# Patient Record
Sex: MALE | Race: WHITE | HISPANIC OR LATINO
[De-identification: names, ages, dates, MRNs, and addresses within clinical notes are randomized per-mention and may not be internally consistent; named-entity substitution may affect disease eponyms.]

---

## 2021-03-10 ENCOUNTER — NON-APPOINTMENT (OUTPATIENT)
Age: 41
End: 2021-03-10

## 2021-03-11 ENCOUNTER — APPOINTMENT (OUTPATIENT)
Dept: DERMATOLOGY | Facility: CLINIC | Age: 41
End: 2021-03-11
Payer: MEDICAID

## 2021-03-11 ENCOUNTER — NON-APPOINTMENT (OUTPATIENT)
Age: 41
End: 2021-03-11

## 2021-03-11 DIAGNOSIS — Z86.718 PERSONAL HISTORY OF OTHER VENOUS THROMBOSIS AND EMBOLISM: ICD-10-CM

## 2021-03-11 DIAGNOSIS — D48.5 NEOPLASM OF UNCERTAIN BEHAVIOR OF SKIN: ICD-10-CM

## 2021-03-11 DIAGNOSIS — Z78.9 OTHER SPECIFIED HEALTH STATUS: ICD-10-CM

## 2021-03-11 DIAGNOSIS — D22.5 MELANOCYTIC NEVI OF TRUNK: ICD-10-CM

## 2021-03-11 PROBLEM — Z00.00 ENCOUNTER FOR PREVENTIVE HEALTH EXAMINATION: Status: ACTIVE | Noted: 2021-03-11

## 2021-03-11 PROCEDURE — 99072 ADDL SUPL MATRL&STAF TM PHE: CPT

## 2021-03-11 PROCEDURE — 99203 OFFICE O/P NEW LOW 30 MIN: CPT

## 2021-03-11 RX ORDER — RIVAROXABAN 10 MG/1
10 TABLET, FILM COATED ORAL
Refills: 0 | Status: ACTIVE | COMMUNITY

## 2021-03-11 NOTE — PHYSICAL EXAM
[Alert] : alert [Oriented x 3] : ~L oriented x 3 [Well Nourished] : well nourished [FreeTextEntry3] : The following areas were examined and no significant abnormalities were seen except as noted below:\par \par Type II skin\par \par scalp, face, eyelids, nose, lips, ears, neck, chest, abdomen, back,groin, buttocks, right arm, left arm, right hand, left hand,\par right  leg, left leg, right foot, left foot\par \par Trunk: Mild small dark brown macules\par Left lower lateral calf: 15 x 14 mm black slightly verrucous plaque with a more purple border\par Left sole: 15 x 10 mm ill-defined calloused plaque with crusts\par \par Where the suspicious lesion seen

## 2021-03-11 NOTE — HISTORY OF PRESENT ILLNESS
[FreeTextEntry1] : Basal cell carcinoma on the left sole [de-identified] : First visit for 40-year-old Montenegrin-speaking male refeerred by Mayra Heck MD, with a nine-month history of a lesion on the left sole.  Originally treated by a podiatrist for her presumed wart with some type of topical patch.  Later treated with a "laser" without improvement.\par Biopsy done: Basal cell carcinoma nodular type.\par No prior history of skin cancer.\par \par Note: Patient has history of prior varicose vein surgery on the left leg\par

## 2021-03-11 NOTE — CONSULT LETTER
[Dear  ___] : Dear  [unfilled], [Consult Letter:] : I had the pleasure of evaluating your patient, [unfilled]. [Consult Closing:] : Thank you very much for allowing me to participate in the care of this patient.  If you have any questions, please do not hesitate to contact me. [Sincerely,] : Sincerely, [FreeTextEntry2] :  Mayra Heck MD [FreeTextEntry1] : He has a biopsy proven basal cell carcinoma on the left sole. This needs to be treated via Mohs surgery, something I do not do.\par \par I have referred him to our Mohs surgeon to have this done.\par \par Please see attached chart note for further details. [FreeTextEntry3] : Bartolome Ch MD\par [address], [address]\par [place] [address]\par Tel ([phone])\par Fax ([phone])\par Private line ([phone])\par

## 2021-03-11 NOTE — ASSESSMENT
[FreeTextEntry1] : Basal cell carcinoma on left sole\par ? Seborrheic keratosis,? Type of nevus in the left calf

## 2021-03-17 ENCOUNTER — APPOINTMENT (OUTPATIENT)
Dept: DERMATOLOGY | Facility: CLINIC | Age: 41
End: 2021-03-17
Payer: MEDICAID

## 2021-03-17 PROCEDURE — 99215 OFFICE O/P EST HI 40 MIN: CPT

## 2021-03-17 PROCEDURE — 99072 ADDL SUPL MATRL&STAF TM PHE: CPT

## 2021-03-24 ENCOUNTER — TRANSCRIPTION ENCOUNTER (OUTPATIENT)
Age: 41
End: 2021-03-24

## 2021-03-29 ENCOUNTER — APPOINTMENT (OUTPATIENT)
Dept: DERMATOLOGY | Facility: CLINIC | Age: 41
End: 2021-03-29

## 2021-04-05 ENCOUNTER — APPOINTMENT (OUTPATIENT)
Dept: DERMATOLOGY | Facility: CLINIC | Age: 41
End: 2021-04-05
Payer: MEDICAID

## 2021-04-05 DIAGNOSIS — L84 CORNS AND CALLOSITIES: ICD-10-CM

## 2021-04-05 PROCEDURE — 99072 ADDL SUPL MATRL&STAF TM PHE: CPT

## 2021-04-05 PROCEDURE — 99213 OFFICE O/P EST LOW 20 MIN: CPT

## 2021-04-05 RX ORDER — GABAPENTIN 100 MG/1
100 CAPSULE ORAL
Qty: 30 | Refills: 0 | Status: DISCONTINUED | COMMUNITY
Start: 2021-01-11

## 2021-04-05 NOTE — ASSESSMENT
[FreeTextEntry1] : Probable seborrheic dermatitis on face\par ? Hypertrophic scar left sole - no evidence of active wart\par ? Incipient corn on right sole

## 2021-04-05 NOTE — HISTORY OF PRESENT ILLNESS
[FreeTextEntry1] : Plantar warts [de-identified] : Followup visit for 40-year-old Persian-speaking male first seen by me on March 11, 2021, with a 10 month history of a lesion on the left sole. Originally treated by a podiatrist for presumed wart with a topical patch and later with a laser without improvement.  Lesion was actually biopsied with a diagnosis of basal cell carcinoma.  A review of the biopsy from the lab revealed an error and it is actually a plantar wart.\par Patient complains of a new lesion on the right foot\par \par patient also complains of "irritation" in the inner cheeks and chin

## 2021-04-05 NOTE — PHYSICAL EXAM
[FreeTextEntry3] : Patient wearing a facemask\par \par Face: Inner cheek-within normal limits\par Chin: Mild ill-defined pinkness and scaling\par Left mid sole: 8 x 8 mm pink smooth firm plaque\par Left distal sole: 3 x 3 mm tan papule

## 2021-06-18 ENCOUNTER — NON-APPOINTMENT (OUTPATIENT)
Age: 41
End: 2021-06-18

## 2021-07-12 ENCOUNTER — APPOINTMENT (OUTPATIENT)
Dept: DERMATOLOGY | Facility: CLINIC | Age: 41
End: 2021-07-12
Payer: MEDICAID

## 2021-07-12 DIAGNOSIS — L91.0 HYPERTROPHIC SCAR: ICD-10-CM

## 2021-07-12 DIAGNOSIS — L21.8 OTHER SEBORRHEIC DERMATITIS: ICD-10-CM

## 2021-07-12 PROCEDURE — 99213 OFFICE O/P EST LOW 20 MIN: CPT

## 2021-07-12 RX ORDER — KETOCONAZOLE 20 MG/G
2 CREAM TOPICAL
Qty: 1 | Refills: 5 | Status: ACTIVE | COMMUNITY
Start: 2021-07-12 | End: 1900-01-01

## 2021-07-12 RX ORDER — HYDROCORTISONE 25 MG/G
2.5 OINTMENT TOPICAL
Qty: 1 | Refills: 5 | Status: ACTIVE | COMMUNITY
Start: 2021-04-05 | End: 1900-01-01

## 2021-07-12 RX ORDER — CLOBETASOL PROPIONATE 0.5 MG/G
0.05 OINTMENT TOPICAL
Qty: 1 | Refills: 1 | Status: ACTIVE | COMMUNITY
Start: 2021-04-05 | End: 1900-01-01

## 2021-07-12 NOTE — PHYSICAL EXAM
[Alert] : alert [Oriented x 3] : ~L oriented x 3 [Well Nourished] : well nourished [FreeTextEntry3] : Face: Practically clear\par Left mid sole: 12 x 12 mm pink smooth plaque\par Left distal sole: Lesion resolved with a 3 mm brown macule

## 2021-07-12 NOTE — ASSESSMENT
[FreeTextEntry1] : ? Seborrheic dermatitis on face: Currently doing well\par Hypertrophic scar on left sole\par Probable corn:  resolved

## 2021-07-12 NOTE — HISTORY OF PRESENT ILLNESS
[FreeTextEntry1] : Plantar warts [de-identified] : Followup visit for 40-year-old Korean-speaking male first seen by me on March 11, 2021, with a 10 month history of a lesion on the left sole. Originally treated by a podiatrist for presumed wart with a topical patch and later with a laser without improvement.  Lesion was actually biopsied with a diagnosis of basal cell carcinoma.  A review of the biopsy from the lab revealed an error and it is actually a plantar wart.\par \par At the last visit, patient is noted to have an 8 x 8 mm pink smooth firm plaque on the left mid sole.\par This was treated with clobetasol ointment 0.05% b.i.d.\par Patient also had a 3 x 3 mm tan papule on the left distal sole felt to be a possible corn.  This lesion was pared deeply with extrusion of a deep core. No obvious wart seen.\par Patient advised to consult a podiatrist for possible padding of both lesions.\par \par Patient also had a rash on the face diagnosed as probable seborrheic dermatitis. This was treated with 2-1/2% hydrocortisone ointment one to 2 times a day p.r.n.\par Patient is using this twice a day.  States the rash recurs if he goes off it.

## 2021-08-18 ENCOUNTER — LABORATORY RESULT (OUTPATIENT)
Age: 41
End: 2021-08-18

## 2021-08-18 ENCOUNTER — APPOINTMENT (OUTPATIENT)
Dept: RHEUMATOLOGY | Facility: CLINIC | Age: 41
End: 2021-08-18
Payer: MEDICAID

## 2021-08-18 ENCOUNTER — NON-APPOINTMENT (OUTPATIENT)
Age: 41
End: 2021-08-18

## 2021-08-18 VITALS
BODY MASS INDEX: 29.12 KG/M2 | HEIGHT: 71 IN | HEART RATE: 59 BPM | OXYGEN SATURATION: 99 % | SYSTOLIC BLOOD PRESSURE: 145 MMHG | DIASTOLIC BLOOD PRESSURE: 84 MMHG | WEIGHT: 208 LBS | TEMPERATURE: 97.5 F

## 2021-08-18 DIAGNOSIS — M25.50 PAIN IN UNSPECIFIED JOINT: ICD-10-CM

## 2021-08-18 DIAGNOSIS — Z86.718 PERSONAL HISTORY OF OTHER VENOUS THROMBOSIS AND EMBOLISM: ICD-10-CM

## 2021-08-18 DIAGNOSIS — R76.0 RAISED ANTIBODY TITER: ICD-10-CM

## 2021-08-18 PROCEDURE — 99204 OFFICE O/P NEW MOD 45 MIN: CPT | Mod: 25

## 2021-08-18 RX ORDER — CYCLOBENZAPRINE HYDROCHLORIDE 10 MG/1
10 TABLET, FILM COATED ORAL
Qty: 30 | Refills: 3 | Status: ACTIVE | COMMUNITY
Start: 2021-08-18 | End: 1900-01-01

## 2021-08-18 NOTE — DATA REVIEWED
[FreeTextEntry1] : homocytein normal\par antithrombin normal\par Protein s AG normal\par factor V leiden normal\par PTT 75 DRVVT>180\par ACL \par ACL iGM >150\par B2GP IgG 109\par B2GP igM 150\par

## 2021-08-18 NOTE — PHYSICAL EXAM
[General Appearance - Well Nourished] : well nourished [General Appearance - Well Developed] : well developed [Sclera] : the sclera and conjunctiva were normal [Nail Clubbing] : no clubbing  or cyanosis of the fingernails [Motor Tone] : muscle strength and tone were normal [Musculoskeletal - Swelling] : no joint swelling seen [] : no rash [Skin Lesions] : no skin lesions [Affect] : the affect was normal [Mood] : the mood was normal [FreeTextEntry1] : wearing compression stocking of the left leg with hyperpigmentation medial leg with mild skin tightness

## 2021-08-18 NOTE — HISTORY OF PRESENT ILLNESS
[FreeTextEntry1] : 39 yo man with history of basal cancer of the sole of the left foot, DVT on Xarelto, fatty liver.  patient presents for evaluation of joint pain.  he is sent by hematologist who has noted that he has positive DRVVT, high titer ACL and B2CP antibodies.  per patient report on xarelto he continues to have DVTs. \par \par patient states that he has joint pain for now about 1 year.  Patient joint pain is worse at rest.  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  however at the end of the day as he rest and drives home the pains become unbearable.  he can not make a fist in the morning and drops things as he can not hold things tight.\par \par patient states that he does notice swelling of the knees and can not use stairs\par \par On rare occasion does he take tylenol for pain.  states that tylenol makes him sleepy.  \par \par He basically bears the pain \par \par ROS: patient denies any alopecia, oral lesions, photosensitive, f/c/s, recurrent sinus infection, nose bleeding,  CP, cough, sob, serositis, low plts or anemia,  \par \par questionable eye inflammation 6 months ago ???\par occasional dry eye \par had leg surgery due to poor circulation \par develop HAs aftre surgery had normal MRI \par no back pain \par \par homocytein normal\par antithrombin normal\par Protein s AG normal\par factor V leiden normal\par PTT 75 DRVVT>180\par ACL \par ACL iGM >150\par B2GP IgG 109\par B2GP igM 150\par \par   \par    [Fever] : no fever [Chills] : no chills [Fatigue] : no fatigue [Oral Ulcers] : no oral ulcers [Cough] : no cough [Shortness of Breath] : no shortness of breath [Chest Pain] : no chest pain [Eye Pain] : no eye pain [Eye Redness] : no eye redness

## 2021-08-18 NOTE — ASSESSMENT
[FreeTextEntry1] : 39 yo man with multiple  DVT and high titer ACL, B2GP and DRVVT. and with joint pain. possible CTD \par \par --per patient while on xarelto continues to have more DVTs and they are considering switching to Coumadin \par --will check serologies and inflammatory markers .  repeat APLS labs \par --will check G6PD in case we need HCQ  \par --will need to obtain record from Dr Jack Franz ( [facility]) \par --GIVen AC , no NSAIDS.  will give flexeril for muscular/joint pain for now \par --patient to bring in eye clinic records

## 2021-09-15 ENCOUNTER — APPOINTMENT (OUTPATIENT)
Dept: RHEUMATOLOGY | Facility: CLINIC | Age: 41
End: 2021-09-15
Payer: MEDICAID

## 2021-09-15 VITALS
BODY MASS INDEX: 28.98 KG/M2 | SYSTOLIC BLOOD PRESSURE: 125 MMHG | WEIGHT: 207 LBS | HEART RATE: 60 BPM | TEMPERATURE: 97.2 F | HEIGHT: 71 IN | DIASTOLIC BLOOD PRESSURE: 76 MMHG | OXYGEN SATURATION: 98 %

## 2021-09-15 PROCEDURE — 99214 OFFICE O/P EST MOD 30 MIN: CPT

## 2021-09-15 RX ORDER — ERGOCALCIFEROL 1.25 MG/1
1.25 MG CAPSULE ORAL
Qty: 12 | Refills: 0 | Status: ACTIVE | COMMUNITY
Start: 2021-09-15 | End: 1900-01-01

## 2021-09-15 NOTE — HISTORY OF PRESENT ILLNESS
[FreeTextEntry1] : 41 yo man with history of basal cancer of the sole of the left foot, DVT on Xarelto, fatty liver.  patient presents for evaluation of joint pain.  he is sent by hematologist who has noted that he has positive DRVVT, high titer ACL and B2CP antibodies.  per patient report on xarelto he continues to have DVTs. \par \par patient states that he has joint pain for now about 1 year.  Patient joint pain is worse at rest.  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  however at the end of the day as he rest and drives home the pains become unbearable.  he can not make a fist in the morning and drops things as he can not hold things tight.\par \par patient states that he does notice swelling of the knees and can not use stairs\par \par On rare occasion does he take tylenol for pain.  states that tylenol makes him sleepy.  \par \par He basically bears the pain \par \par ROS: patient denies any alopecia, oral lesions, photosensitive, f/c/s, recurrent sinus infection, nose bleeding,  CP, cough, sob, serositis, low plts or anemia.  endorses occasional dry eye which he attributes to contact lens wear. no history of parotid gland pain or enlargement. nof/c/s, night sweat or lost of wt. \par \par questionable eye inflammation 6 months ago ???\par occasional dry eye \par had leg surgery due to poor circulation \par develop HAs after surgery had normal MRI \par no back pain \par \par labs: \par dsDNA 91\par c4 5 \par c3 80\par normal UA and PCR\par Ro>8\par deborah 1:320 speckled\par RNA polymerase 21( borderline) \par panca positive but negative MPO and PR3\par NEGATIVE: RF/CCP/HLA b27/sm/rnp/la/IVA/centromere\par normal G6PD\par normal TSH/CPK\par ++TG antibodies 138\par negative GC/LYme\par homocytein normal\par antithrombin normal\par Protein s AG normal\par factor V leiden normal\par PTT 75 DRVVT>180 ( on xarelto) \par ACL  ( >150) \par ACL iGM >150 ( >150) \par B2GP IgG 109 ( 104)\par B2GP igM 150 ( >150) \par vit d 21\par \par   \par

## 2021-09-15 NOTE — ASSESSMENT
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  \par \par --patient is to start plaquenil.  he will have eye exam once every year.  we have  discuss potential side effects\par --will closely monitor.  he is to repeat Urine studies and blood work in 6 weeks.   \par --vit d deficency start ergocal \par --would favor coumadin with ASA given his APLS and multiple DVTs.  he is to further discuss with hematology

## 2021-09-15 NOTE — PHYSICAL EXAM
[General Appearance - Well Nourished] : well nourished [General Appearance - Well Developed] : well developed [Sclera] : the sclera and conjunctiva were normal [Hearing Threshold Finger Rub Not [place]] : hearing was normal [Nail Clubbing] : no clubbing  or cyanosis of the fingernails [Musculoskeletal - Swelling] : no joint swelling seen [Motor Tone] : muscle strength and tone were normal [] : no rash [Skin Lesions] : no skin lesions [FreeTextEntry1] : leftLE wearing compression stocking and has hyperpigmentation over the medial malleolus [Affect] : the affect was normal [Mood] : the mood was normal

## 2021-10-04 LAB
25(OH)D3 SERPL-MCNC: 21.8 NG/ML
ALBUMIN SERPL ELPH-MCNC: 4.7 G/DL
ALP BLD-CCNC: 159 U/L
ALT SERPL-CCNC: 24 U/L
ANA PAT FLD IF-IMP: ABNORMAL
ANA SER IF-ACNC: ABNORMAL
ANION GAP SERPL CALC-SCNC: 11 MMOL/L
APPEARANCE: CLEAR
APTT 2H P 1:4 NP PPP: 63.4 SEC
APTT 2H P INC PPP: 66.6 SEC
APTT 50/50 MIX COMMENT: NORMAL
APTT IMM NP/PRE NP PPP: 59.9 SEC
APTT INV RATIO PPP: 77 SEC
AST SERPL-CCNC: 16 U/L
B BURGDOR AB SER-IMP: NEGATIVE
B BURGDOR IGM PATRN SER IB-IMP: NEGATIVE
B BURGDOR18KD IGG SER QL IB: NORMAL
B BURGDOR23KD IGG SER QL IB: NORMAL
B BURGDOR23KD IGM SER QL IB: NORMAL
B BURGDOR28KD IGG SER QL IB: NORMAL
B BURGDOR30KD IGG SER QL IB: NORMAL
B BURGDOR31KD IGG SER QL IB: NORMAL
B BURGDOR39KD IGG SER QL IB: NORMAL
B BURGDOR39KD IGM SER QL IB: NORMAL
B BURGDOR41KD IGG SER QL IB: NORMAL
B BURGDOR41KD IGM SER QL IB: NORMAL
B BURGDOR45KD IGG SER QL IB: NORMAL
B BURGDOR58KD IGG SER QL IB: NORMAL
B BURGDOR66KD IGG SER QL IB: NORMAL
B BURGDOR93KD IGG SER QL IB: NORMAL
B19V IGG SER QL IA: 8.08 INDEX
B19V IGG+IGM SER-IMP: NORMAL
B19V IGG+IGM SER-IMP: POSITIVE
B19V IGM FLD-ACNC: 0.3 INDEX
B19V IGM SER-ACNC: NEGATIVE
B2 GLYCOPROT1 IGA SERPL IA-ACNC: 9.1 SAU
B2 GLYCOPROT1 IGG SER-ACNC: 104.1 SGU
B2 GLYCOPROT1 IGM SER-ACNC: >150 SMU
BASOPHILS # BLD AUTO: 0.02 K/UL
BASOPHILS NFR BLD AUTO: 0.4 %
BILIRUB SERPL-MCNC: 0.5 MG/DL
BILIRUBIN URINE: NEGATIVE
BLOOD URINE: NEGATIVE
BUN SERPL-MCNC: 14 MG/DL
C TRACH RRNA SPEC QL NAA+PROBE: NOT DETECTED
C3 SERPL-MCNC: 80 MG/DL
C4 SERPL-MCNC: 5 MG/DL
CALCIUM SERPL-MCNC: 9.1 MG/DL
CARDIOLIPIN AB SER IA-ACNC: POSITIVE
CARDIOLIPIN IGM SER-MCNC: >150 GPL
CARDIOLIPIN IGM SER-MCNC: >150 MPL
CCP AB SER IA-ACNC: <8 UNITS
CENTROMERE IGG SER-ACNC: <0.2 CD:130001892
CHLORIDE SERPL-SCNC: 103 MMOL/L
CK SERPL-CCNC: 124 U/L
CO2 SERPL-SCNC: 25 MMOL/L
COLOR: NORMAL
CONFIRM: 35.8 SEC
CREAT SERPL-MCNC: 0.85 MG/DL
CREAT SPEC-SCNC: 87 MG/DL
CREAT/PROT UR: 0.1 RATIO
CRP SERPL-MCNC: 13 MG/L
DRVVT IMM 1:2 NP PPP: ABNORMAL
DRVVT SCREEN TO CONFIRM RATIO: 2.68 RATIO
DSDNA AB SER-ACNC: 91 IU/ML
ENA JO1 AB SER IA-ACNC: <0.2 AL
ENA RNP AB SER IA-ACNC: <0.2 AL
ENA SCL70 IGG SER IA-ACNC: <0.2 AL
ENA SM AB SER IA-ACNC: <0.2 AL
ENA SS-A AB SER IA-ACNC: >8 AL
ENA SS-B AB SER IA-ACNC: <0.2 AL
EOSINOPHIL # BLD AUTO: 0.13 K/UL
EOSINOPHIL NFR BLD AUTO: 2.8 %
ERYTHROCYTE [SEDIMENTATION RATE] IN BLOOD BY WESTERGREN METHOD: 29 MM/HR
G6PD SER-CCNC: 16.6 U/G HGB
GLUCOSE QUALITATIVE U: NEGATIVE
GLUCOSE SERPL-MCNC: 92 MG/DL
HCT VFR BLD CALC: 46.3 %
HGB BLD-MCNC: 14.4 G/DL
HLA-B27 RELATED AG QL: NEGATIVE
IMM GRANULOCYTES NFR BLD AUTO: 0.2 %
KETONES URINE: NEGATIVE
LEUKOCYTE ESTERASE URINE: NEGATIVE
LYMPHOCYTES # BLD AUTO: 1.05 K/UL
LYMPHOCYTES NFR BLD AUTO: 22.9 %
MAN DIFF?: NORMAL
MCHC RBC-ENTMCNC: 25.8 PG
MCHC RBC-ENTMCNC: 31.1 GM/DL
MCV RBC AUTO: 83 FL
MONOCYTES # BLD AUTO: 0.47 K/UL
MONOCYTES NFR BLD AUTO: 10.2 %
MPO AB + PR3 PNL SER: NORMAL
N GONORRHOEA RRNA SPEC QL NAA+PROBE: NOT DETECTED
NEUTROPHILS # BLD AUTO: 2.91 K/UL
NEUTROPHILS NFR BLD AUTO: 63.5 %
NITRITE URINE: NEGATIVE
NPP NORMAL POOLED PLASMA: 32.7 SECS
PH URINE: 6
PLATELET # BLD AUTO: 153 K/UL
POTASSIUM SERPL-SCNC: 4.2 MMOL/L
PROT SERPL-MCNC: 7.7 G/DL
PROT UR-MCNC: 4 MG/DL
PROTEIN URINE: NEGATIVE
RBC # BLD: 5.58 M/UL
RBC # FLD: 14.1 %
RF+CCP IGG SER-IMP: NEGATIVE
RHEUMATOID FACT SER QL: <10 IU/ML
RNA POLYMERASE III IGG: 21 UNITS
SCREEN DRVVT: 129.3 SEC
SODIUM SERPL-SCNC: 139 MMOL/L
SOURCE AMPLIFICATION: NORMAL
SPECIFIC GRAVITY URINE: 1.01
T PALLIDUM AB SER QL IA: NEGATIVE
THYROGLOB AB SERPL-ACNC: 138 IU/ML
THYROPEROXIDASE AB SERPL IA-ACNC: 33.2 IU/ML
TSH SERPL-ACNC: 1.54 UIU/ML
UROBILINOGEN URINE: NORMAL
WBC # FLD AUTO: 4.59 K/UL

## 2021-10-12 ENCOUNTER — APPOINTMENT (OUTPATIENT)
Dept: DERMATOLOGY | Facility: CLINIC | Age: 41
End: 2021-10-12

## 2021-12-15 ENCOUNTER — APPOINTMENT (OUTPATIENT)
Dept: RHEUMATOLOGY | Facility: CLINIC | Age: 41
End: 2021-12-15
Payer: MEDICAID

## 2021-12-15 ENCOUNTER — NON-APPOINTMENT (OUTPATIENT)
Age: 41
End: 2021-12-15

## 2021-12-15 ENCOUNTER — APPOINTMENT (OUTPATIENT)
Dept: OPHTHALMOLOGY | Facility: CLINIC | Age: 41
End: 2021-12-15
Payer: MEDICAID

## 2021-12-15 VITALS
SYSTOLIC BLOOD PRESSURE: 133 MMHG | HEART RATE: 72 BPM | BODY MASS INDEX: 28.98 KG/M2 | DIASTOLIC BLOOD PRESSURE: 76 MMHG | OXYGEN SATURATION: 98 % | RESPIRATION RATE: 18 BRPM | WEIGHT: 207 LBS | HEIGHT: 71 IN

## 2021-12-15 PROCEDURE — 92134 CPTRZ OPH DX IMG PST SGM RTA: CPT

## 2021-12-15 PROCEDURE — 99214 OFFICE O/P EST MOD 30 MIN: CPT

## 2021-12-15 PROCEDURE — 92004 COMPRE OPH EXAM NEW PT 1/>: CPT

## 2021-12-15 NOTE — HISTORY OF PRESENT ILLNESS
[FreeTextEntry1] : 41 yo man with history of basal cancer of the sole of the left foot, fatty liver, APLS ( with triple positive and multiple DVTS) , SLE ( +DEBORAH/+dsDNA/low complements, arthritis and possible oral ulcers) , sjogrens ( dry eye/mouth and positive Ro) \par \par patient was started on plaquenil.  patient denies any more joint pain or swelling.  previously could not hold things tight due to have swelling and pain.  he was not able to use stairs due to knee pain.  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  rest worsen these joint pains.  patient denies nay new rashes, alopecia, oral lesions, sob/cough. will be see eye clinic today.  new labs show \par \par \par ROS: patient denies any alopecia, oral lesions, photosensitive, f/c/s, recurrent sinus infection, nose bleeding,  CP, cough, sob, serositis, low plts or anemia.  endorses occasional dry eye which he attributes to contact lens wear. no history of parotid gland pain or enlargement. nof/c/s, night sweat or lost of wt. \par \par questionable eye inflammation 6 months ago ???\par occasional dry eye \par had leg surgery due to poor circulation ( DVts) \par develop HAs after surgery had normal MRI \par no back pain \par \par labs: \par dsDNA 91\par c4 5 \par c3 80\par normal UA and PCR\par Ro>8\par deborah 1:320 speckled\par RNA polymerase 21( borderline) \par panca positive but negative MPO and PR3\par NEGATIVE: RF/CCP/HLA b27/sm/rnp/la/IVA/centromere\par normal G6PD\par normal TSH/CPK\par ++TG antibodies 138\par negative GC/LYme\par homocytein normal\par antithrombin normal\par Protein s AG normal\par factor V leiden normal\par PTT 75 DRVVT>180 ( on xarelto) \par ACL  ( >150) \par ACL iGM >150 ( >150) \par B2GP IgG 109 ( 104)\par B2GP igM 150 ( >150) \par vit d 21\par \par   \par

## 2021-12-15 NOTE — ASSESSMENT
[FreeTextEntry1] : 39 yo man with APLS, SLE and most likley sjogrens started on HCQ and clinically doing better.  No more joint pain or stiffness \par \par --patient will need close monitoring \par --I agree with hematology patient should be on coumadin for life \par --cont HCQ and have eye exam\par --he is to get schirmers test \par --will sent out spep/immunoglbulin and LDH \par --labs proir to next visit

## 2021-12-15 NOTE — PHYSICAL EXAM
[General Appearance - Well Nourished] : well nourished [General Appearance - Well Developed] : well developed [Sclera] : the sclera and conjunctiva were normal [Hearing Threshold Finger Rub Not [place]] : hearing was normal [Nail Clubbing] : no clubbing  or cyanosis of the fingernails [Musculoskeletal - Swelling] : no joint swelling seen [Motor Tone] : muscle strength and tone were normal [] : no rash [Skin Lesions] : no skin lesions [Affect] : the affect was normal [Mood] : the mood was normal

## 2022-03-02 ENCOUNTER — APPOINTMENT (OUTPATIENT)
Dept: RHEUMATOLOGY | Facility: CLINIC | Age: 42
End: 2022-03-02
Payer: MEDICAID

## 2022-03-02 PROCEDURE — 99214 OFFICE O/P EST MOD 30 MIN: CPT | Mod: 95

## 2022-03-02 NOTE — ASSESSMENT
[FreeTextEntry1] : 39 yo man with APLS ( triple antibody positive and multiple DVTS, SLE ( positive CARMINE/DNA/arthritis and possible oral lesions) and most likley sjogrens ( dry eye and positive Ro) . Joint complaints much improved while on HCQ \par \par --cont HCQ, he ios to follow up with eye clinic \par --patient is to follow up with hematology for AC for life ( ideally coumadin) \par --will need to obtain record from Dr Jack Franz ( [facility]) \par --he is to see neurology for eval of fett burning

## 2022-03-02 NOTE — PHYSICAL EXAM
[General Appearance - Well Nourished] : well nourished [General Appearance - Well Developed] : well developed [Sclera] : the sclera and conjunctiva were normal [Hearing Threshold Finger Rub Not [place]] : hearing was normal [Affect] : the affect was normal [Mood] : the mood was normal

## 2022-03-02 NOTE — HISTORY OF PRESENT ILLNESS
[Home] : at home, [unfilled] , at the time of the visit. [Medical Office: ([facility])___] : at the medical office located in  [Verbal consent obtained from patient] : the patient, [unfilled] [FreeTextEntry1] : 42 yo man with history of basal cancer of the sole of the left foot, fatty liver, APLS ( with triple positive and multiple DVTS) , SLE ( +DEBORAH/+dsDNA/low complements, arthritis and possible oral ulcers) , Sjogren ( dry eye/mouth and positive Ro) \par \par patient was started on plaquenil.  patient denies any more joint pain or swelling.  previously could not hold things tight due to have swelling and pain.  he was not able to use stairs due to knee pain.  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  rest worsen these joint pains.  since starting HCQ joints are no longer an issue.  patient denies any new rashes, alopecia, oral lesions, sob/cough. will be see eye clinic.  new labs show.\par \par he does complain of foot burning bilaterally especially at night.  he denies any foot swelling.  this is a relatively new symptoms.  the burning wakes him up at night.  patient in past had DVTs and required surgery to LE \par \par \par ROS: patient denies any alopecia, oral lesions, photosensitive, f/c/s, recurrent sinus infection, nose bleeding,  CP, cough, sob, serositis, low plts or anemia.  endorses occasional dry eye which he attributes to contact lens wear. no history of parotid gland pain or enlargement. nof/c/s, night sweat or lost of wt. \par \par questionable eye inflammation 6 months ago ???\par occasional dry eye \par had leg surgery due to poor circulation ( DVts) \par develop HAs after surgery had normal MRI \par no back pain \par \par labs: \par dsDNA 91\par c4 5 \par c3 80\par normal UA and PCR\par Ro>8\par deborah 1:320 speckled\par RNA polymerase 21( borderline) \par panca positive but negative MPO and PR3\par NEGATIVE: RF/CCP/HLA b27/sm/rnp/la/IVA/centromere\par normal G6PD\par normal TSH/CPK\par ++TG antibodies 138\par negative GC/LYme\par homocytein normal\par antithrombin normal\par Protein s AG normal\par factor V leiden normal\par PTT 75 DRVVT>180 ( on xarelto) \par ACL  ( >150) \par ACL iGM >150 ( >150) \par B2GP IgG 109 ( 104)\par B2GP igM 150 ( >150) \par vit d 21\par \par   \par

## 2022-05-04 ENCOUNTER — APPOINTMENT (OUTPATIENT)
Dept: RHEUMATOLOGY | Facility: CLINIC | Age: 42
End: 2022-05-04

## 2022-05-13 ENCOUNTER — NON-APPOINTMENT (OUTPATIENT)
Age: 42
End: 2022-05-13

## 2022-06-15 ENCOUNTER — APPOINTMENT (OUTPATIENT)
Dept: RHEUMATOLOGY | Facility: CLINIC | Age: 42
End: 2022-06-15
Payer: MEDICAID

## 2022-06-15 ENCOUNTER — NON-APPOINTMENT (OUTPATIENT)
Age: 42
End: 2022-06-15

## 2022-06-15 ENCOUNTER — APPOINTMENT (OUTPATIENT)
Dept: OPHTHALMOLOGY | Facility: CLINIC | Age: 42
End: 2022-06-15
Payer: MEDICAID

## 2022-06-15 VITALS
OXYGEN SATURATION: 99 % | DIASTOLIC BLOOD PRESSURE: 78 MMHG | BODY MASS INDEX: 30.24 KG/M2 | SYSTOLIC BLOOD PRESSURE: 131 MMHG | HEART RATE: 69 BPM | HEIGHT: 71 IN | WEIGHT: 216 LBS

## 2022-06-15 DIAGNOSIS — R20.2 PARESTHESIA OF SKIN: ICD-10-CM

## 2022-06-15 PROCEDURE — 92012 INTRM OPH EXAM EST PATIENT: CPT

## 2022-06-15 PROCEDURE — 92083 EXTENDED VISUAL FIELD XM: CPT

## 2022-06-15 PROCEDURE — 99214 OFFICE O/P EST MOD 30 MIN: CPT

## 2022-06-15 NOTE — HISTORY OF PRESENT ILLNESS
[FreeTextEntry1] : 40 yo man with history of basal cancer of the sole of the left foot, fatty liver, APLS ( with triple positive and multiple DVTS) , SLE ( +DEBORAH/+dsDNA/low complements, arthritis and possible oral ulcers) , Sjogren ( dry eye/mouth and positive Ro) \par \par patient was started on Plaquenil.  patient denies any more joint pain or swelling.  previously could not hold things tight due to have swelling and pain.  he was not able to use stairs due to knee pain.  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  rest worsen these joint pains.  since starting HCQ joints are no longer an issue.  patient denies any new rashes, alopecia, oral lesions, sob/cough. will be see eye clinic.  new labs show.\par \par he does complain of foot burning bilaterally especially at night.  he denies any foot swelling.  this is a relatively new symptoms.  the burning wakes him up at night.  patient in past had DVTs and required surgery to LE \par \par \par Today: patient is currently on warfarin and has his INR checked monthly.  No new clotting issues.  Patient has dry eye and uses artificial tears 3 times a day.  he experiences martinez sensation in eye.  No dry mouth.  will be seeing eye clinic today.  Patient remains on HCQ 400mg daily and tolerates this well.  he denies any new rashes or joint pain pain.  he denies any parotis enlargement,f/c/s/wt loss.  he denies any oral lesions, alopecia, facial rash, sob/cough/cp/Gi issues.  No changes in urination.   \par \par questionable eye inflammation 6 months ago ???\par occasional dry eye \par had leg surgery due to poor circulation ( DVts) \par develop HAs after surgery had normal MRI \par no back pain \par \par labs: \par dsDNA 91\par c4 5 \par c3 80\par normal UA and PCR\par Ro>8\par deborah 1:320 speckled\par RNA polymerase 21( borderline) \par panca positive but negative MPO and PR3\par NEGATIVE: RF/CCP/HLA b27/sm/rnp/la/IVA/centromere\par normal G6PD\par normal TSH/CPK\par ++TG antibodies 138\par negative GC/LYme\par homocytein normal\par antithrombin normal\par Protein s AG normal\par factor V leiden normal\par PTT 75 DRVVT>180 ( on xarelto) \par ACL  ( >150) \par ACL iGM >150 ( >150) \par B2GP IgG 109 ( 104)\par B2GP igM 150 ( >150) \par vit d 21\par \par   \par

## 2022-06-15 NOTE — PHYSICAL EXAM
[General Appearance - Well Nourished] : well nourished [General Appearance - Well Developed] : well developed [Sclera] : the sclera and conjunctiva were normal [Hearing Threshold Finger Rub Not [place]] : hearing was normal [Nail Clubbing] : no clubbing  or cyanosis of the fingernails [Musculoskeletal - Swelling] : no joint swelling seen [Motor Tone] : muscle strength and tone were normal [] : no rash [Skin Lesions] : no skin lesions [Affect] : the affect was normal [Mood] : the mood was normal

## 2022-06-15 NOTE — ASSESSMENT
[FreeTextEntry1] : 39 yo man with APLS ( triple antibody positive, multiple DVTs), SLE ( positive CARMINE /DNA/arthritis and possible oral lesion) and sjogrens ( dry eye and postive RO) .  Joint complaints have resolved with HCQ \par \par --cont HCQ, eye appointment today \par --cont to follow up with hematology: cont warfarin \par --lupus labs prior to next appointmnet \par --will sent peripheral neuropathy blood work given occasional burning of feet \par --patient is to avoid sunbathing and this is a well know lupus trigger.  we have discuss ways to protect himself.  we have discuss symptoms that suggest a lupus flare

## 2022-07-20 ENCOUNTER — APPOINTMENT (OUTPATIENT)
Dept: OPHTHALMOLOGY | Facility: CLINIC | Age: 42
End: 2022-07-20

## 2022-09-21 ENCOUNTER — APPOINTMENT (OUTPATIENT)
Dept: RHEUMATOLOGY | Facility: CLINIC | Age: 42
End: 2022-09-21

## 2022-10-19 ENCOUNTER — APPOINTMENT (OUTPATIENT)
Dept: RHEUMATOLOGY | Facility: CLINIC | Age: 42
End: 2022-10-19

## 2022-10-19 VITALS
BODY MASS INDEX: 30.52 KG/M2 | WEIGHT: 218 LBS | OXYGEN SATURATION: 96 % | TEMPERATURE: 97.6 F | SYSTOLIC BLOOD PRESSURE: 128 MMHG | HEIGHT: 71 IN | HEART RATE: 88 BPM | DIASTOLIC BLOOD PRESSURE: 76 MMHG

## 2022-10-19 DIAGNOSIS — M35.00 SICCA SYNDROME, UNSPECIFIED: ICD-10-CM

## 2022-10-19 DIAGNOSIS — D68.61 ANTIPHOSPHOLIPID SYNDROME: ICD-10-CM

## 2022-10-19 PROCEDURE — 99214 OFFICE O/P EST MOD 30 MIN: CPT

## 2022-10-19 RX ORDER — ACETAMINOPHEN 650 MG/1
650 TABLET, FILM COATED, EXTENDED RELEASE ORAL
Qty: 60 | Refills: 1 | Status: ACTIVE | COMMUNITY
Start: 2022-10-19 | End: 1900-01-01

## 2022-10-19 NOTE — HISTORY OF PRESENT ILLNESS
[FreeTextEntry1] : 40 yo man with history of basal cancer of the sole of the left foot, fatty liver, APLS ( with triple positive and multiple DVTS) , SLE ( +DEBORAH/+dsDNA/low complements, arthritis and possible oral ulcers) , Sjogren ( dry eye/mouth and positive Ro) \par \par patient is on plaquneil 400mg daily.  patient denies any more joint swelling. He has right hip pain especially if he sleeps with his hip rotated externally or in figure 4.  it takes him a 30 minutes to start feeling in the morning.  hip pain worse with using stairs and lifting leg.  No back pian.   this has been present for now 1 month.  over time the pain is less.  does not recall trauma.  has not tried any pain meds. 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   he is on warfarin with INR 2.6.  labs with low complement and negative dsDNA, urine normal.  he has chronic leukopenia and mildly low plts.  Patient has dry eye and uses artificial tears 3 times a day.  he experiences martinez sensation in eye.  No dry mouth.  will be seeing eye clinic today. he denies any new rashes.  he denies any parotid enlargement,f/c/s/wt loss.  he denies any  alopecia, facial rash, sob/cough/cp/Gi issues.  No changes in urination.   \par \par he does complain of foot burning bilaterally especially at night.  he denies any foot swelling.  this is a relatively new symptoms.  the burning wakes him up at night.  patient in past had DVTs and required surgery to LE \par \par \par questionable eye inflammation 6 months ago ???\par occasional dry eye \par had leg surgery due to poor circulation ( DVts) \par develop HAs after surgery had normal MRI \par no back pain \par \par labs: \par dsDNA 91\par c4 5 \par c3 80\par normal UA and PCR\par Ro>8\par deborah 1:320 speckled\par RNA polymerase 21( borderline) \par panca positive but negative MPO and PR3\par NEGATIVE: RF/CCP/HLA b27/sm/rnp/la/IVA/centromere\par normal G6PD\par normal TSH/CPK\par ++TG antibodies 138\par negative GC/LYme\par homocytein normal\par antithrombin normal\par Protein s AG normal\par factor V leiden normal\par PTT 75 DRVVT>180 ( on xarelto) \par ACL  ( >150) \par ACL iGM >150 ( >150) \par B2GP IgG 109 ( 104)\par B2GP igM 150 ( >150) \par vit d 21\par \par   \par

## 2022-10-19 NOTE — PHYSICAL EXAM
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

## 2022-10-19 NOTE — ASSESSMENT
[FreeTextEntry1] : 41 yo man with APLS ( triple antibody positive, multiple DVTs), SLE ( positive CARMINE /DNA/arthritis and oral lesion) and sjogrens ( dry eye and positive RO) .  doing better on HCQ however hs developed right hip pain and is noted to have mild limitation on external and internal rotation \par \par --patient is to have hip xray r/o AVN or OA.  Tylenol for pain  \par --cont HCQ, eye exam every 6 months \par --cont to follow up with hematology: cont warfarin \par --lupus labs prior to next appointment \par --patient is to avoid sunbathing and this is a well know lupus trigger.  we have discuss ways to protect himself.  we have discuss symptoms that suggest a lupus flare

## 2022-12-21 ENCOUNTER — APPOINTMENT (OUTPATIENT)
Dept: RHEUMATOLOGY | Facility: CLINIC | Age: 42
End: 2022-12-21

## 2023-01-04 ENCOUNTER — APPOINTMENT (OUTPATIENT)
Dept: RHEUMATOLOGY | Facility: CLINIC | Age: 43
End: 2023-01-04

## 2023-03-15 ENCOUNTER — APPOINTMENT (OUTPATIENT)
Dept: RHEUMATOLOGY | Facility: CLINIC | Age: 43
End: 2023-03-15

## 2023-07-19 ENCOUNTER — APPOINTMENT (OUTPATIENT)
Dept: RHEUMATOLOGY | Facility: CLINIC | Age: 43
End: 2023-07-19

## 2024-02-05 ENCOUNTER — APPOINTMENT (OUTPATIENT)
Dept: OPHTHALMOLOGY | Facility: CLINIC | Age: 44
End: 2024-02-05
Payer: COMMERCIAL

## 2024-02-05 ENCOUNTER — NON-APPOINTMENT (OUTPATIENT)
Age: 44
End: 2024-02-05

## 2024-02-05 PROCEDURE — 92014 COMPRE OPH EXAM EST PT 1/>: CPT

## 2024-02-28 ENCOUNTER — APPOINTMENT (OUTPATIENT)
Dept: OPHTHALMOLOGY | Facility: CLINIC | Age: 44
End: 2024-02-28
Payer: COMMERCIAL

## 2024-02-28 ENCOUNTER — APPOINTMENT (OUTPATIENT)
Dept: RHEUMATOLOGY | Facility: CLINIC | Age: 44
End: 2024-02-28
Payer: COMMERCIAL

## 2024-02-28 ENCOUNTER — LABORATORY RESULT (OUTPATIENT)
Age: 44
End: 2024-02-28

## 2024-02-28 ENCOUNTER — NON-APPOINTMENT (OUTPATIENT)
Age: 44
End: 2024-02-28

## 2024-02-28 VITALS
DIASTOLIC BLOOD PRESSURE: 74 MMHG | TEMPERATURE: 98.4 F | HEIGHT: 71 IN | WEIGHT: 215 LBS | OXYGEN SATURATION: 97 % | SYSTOLIC BLOOD PRESSURE: 119 MMHG | HEART RATE: 66 BPM | BODY MASS INDEX: 30.1 KG/M2

## 2024-02-28 PROCEDURE — 92012 INTRM OPH EXAM EST PATIENT: CPT | Mod: 25

## 2024-02-28 PROCEDURE — 99214 OFFICE O/P EST MOD 30 MIN: CPT

## 2024-02-28 NOTE — HISTORY OF PRESENT ILLNESS
[FreeTextEntry1] : Patient does not follow closely.  he was last seen 10/2022.  last labs 1/2023   42 yo man with history of basal cancer of the sole of the left foot, fatty liver, APLS ( with triple positive and multiple DVTS) , SLE ( +DEBORAH/+dsDNA/low complements, arthritis and possible oral ulcers) , Sjogren ( dry eye/mouth and positive Ro)   patient is on Plaquenil 400mg daily.  He had eye exam today. patient denies any more joint pain or joint swelling. denies any morning stiffness.    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   he is on warfarin.  labs with low complement and negative dsDNA, urine normal.  he has chronic leukopenia and mildly low plts.  Patient has dry eye and uses artificial tears 3 times a day.  he experiences sand sensation in eye.  No dry mouth.  he denies any new rashes.  he denies any parotid enlargement/c/s/wt loss.  he denies any alopecia, facial rash, sob/cough/cp/Gi issues.  No changes in urination.     In the past he complaint sof foot burning and hip pain.  hip xray normal  labs:  dsDNA 91 c4 5  c3 80 normal UA and PCR Ro>8 deborah 1:320 speckled RNA polymerase 21( borderline)  panca positive but negative MPO and PR3 NEGATIVE: RF/CCP/HLA b27/sm/rnp/la/IVA/centromere normal G6PD normal TSH/CPK ++TG antibodies 138 negative GC/LYme homocytein normal antithrombin normal Protein s AG normal factor V leiden normal PTT 75 DRVVT>180 ( on xarelto)  ACL  ( >150)  ACL iGM >150 ( >150)  B2GP IgG 109 ( 104) B2GP igM 150 ( >150)  vit d 21

## 2024-02-28 NOTE — ASSESSMENT
[FreeTextEntry1] : 42 yo man with APLS ( triple antibody positive, multiple DVTs), SLE ( positive CARMINE /DNA/arthritis and oral lesion) and sjogrens ( dry eye and positive RO) .  doing better on HCQ.  [facility] patient does not follow closely.  last seen 10/2022 and last labs 1/2023 for hamatology   --cont HCQ, eye exam every 6 months.  he had eye exam today   --cont to follow up with hematology: cont warfarin  --lupus labs prior today --patient is to avoid sunbathing as this is a well know lupus trigger.  we have discussed ways to protect himself.  we have discussed symptoms that suggest a lupus flare

## 2024-02-28 NOTE — REVIEW OF SYSTEMS
[Fever] : no fever [Chills] : no chills [Red Eyes] : eyes not red [Nosebleeds] : no nosebleeds [Eye Pain] : no eye pain [Nasal Discharge] : no nasal discharge [Chest Pain] : no chest pain [SOB on Exertion] : no shortness of breath during exertion [Palpitations] : no palpitations [Cough] : no cough [Diarrhea] : no diarrhea [Constipation] : no constipation

## 2024-02-28 NOTE — PHYSICAL EXAM
[General Appearance - Well Nourished] : well nourished [Sclera] : the sclera and conjunctiva were normal [General Appearance - Well Developed] : well developed [Hearing Threshold Finger Rub Not [place]] : hearing was normal [Heart Rate And Rhythm] : heart rate was normal and rhythm regular [Auscultation Breath Sounds / Voice Sounds] : lungs were clear to auscultation bilaterally [Heart Sounds] : normal S1 and S2 [Nail Clubbing] : no clubbing  or cyanosis of the fingernails [Motor Tone] : muscle strength and tone were normal [Musculoskeletal - Swelling] : no joint swelling seen [] : no rash [Skin Lesions] : no skin lesions [Impaired Insight] : insight and judgment were intact [Affect] : the affect was normal [Mood] : the mood was normal

## 2024-03-07 LAB
ALBUMIN SERPL ELPH-MCNC: 4.5 G/DL
ALP BLD-CCNC: 117 U/L
ALT SERPL-CCNC: 64 U/L
ANION GAP SERPL CALC-SCNC: 11 MMOL/L
APPEARANCE: ABNORMAL
AST SERPL-CCNC: 41 U/L
BILIRUB SERPL-MCNC: 0.6 MG/DL
BILIRUBIN URINE: NEGATIVE
BLOOD URINE: NEGATIVE
BUN SERPL-MCNC: 12 MG/DL
C3 SERPL-MCNC: 90 MG/DL
C4 SERPL-MCNC: 4 MG/DL
CALCIUM SERPL-MCNC: 8.8 MG/DL
CHLORIDE SERPL-SCNC: 103 MMOL/L
CK SERPL-CCNC: 125 U/L
CO2 SERPL-SCNC: 22 MMOL/L
COLOR: YELLOW
CREAT SERPL-MCNC: 0.73 MG/DL
CREAT SPEC-SCNC: 162 MG/DL
CREAT/PROT UR: 0.1 RATIO
CRP SERPL-MCNC: 3 MG/L
DSDNA AB SER-ACNC: 30 IU/ML
EGFR: 116 ML/MIN/1.73M2
ERYTHROCYTE [SEDIMENTATION RATE] IN BLOOD BY WESTERGREN METHOD: 14 MM/HR
GLUCOSE QUALITATIVE U: NEGATIVE MG/DL
GLUCOSE SERPL-MCNC: 93 MG/DL
HCT VFR BLD CALC: 48.8 %
HGB BLD-MCNC: 15.2 G/DL
KETONES URINE: NEGATIVE MG/DL
LDH SERPL-CCNC: 219 U/L
LEUKOCYTE ESTERASE URINE: NEGATIVE
M PROTEIN SPEC IFE-MCNC: NORMAL
MCHC RBC-ENTMCNC: 27.7 PG
MCHC RBC-ENTMCNC: 31.1 GM/DL
MCV RBC AUTO: 89.1 FL
NITRITE URINE: NEGATIVE
PH URINE: 6
PLATELET # BLD AUTO: 174 K/UL
POTASSIUM SERPL-SCNC: 4 MMOL/L
PROT SERPL-MCNC: 7.4 G/DL
PROT UR-MCNC: 10 MG/DL
PROTEIN URINE: NEGATIVE MG/DL
RBC # BLD: 5.48 M/UL
RBC # FLD: 14.8 %
SODIUM SERPL-SCNC: 136 MMOL/L
SPECIFIC GRAVITY URINE: 1.02
TSH SERPL-ACNC: 1.14 UIU/ML
UROBILINOGEN URINE: 0.2 MG/DL
WBC # FLD AUTO: 4.56 K/UL

## 2024-05-08 ENCOUNTER — APPOINTMENT (OUTPATIENT)
Dept: PULMONOLOGY | Facility: CLINIC | Age: 44
End: 2024-05-08

## 2024-05-08 ENCOUNTER — APPOINTMENT (OUTPATIENT)
Dept: RHEUMATOLOGY | Facility: CLINIC | Age: 44
End: 2024-05-08
Payer: COMMERCIAL

## 2024-05-08 VITALS
SYSTOLIC BLOOD PRESSURE: 129 MMHG | HEART RATE: 65 BPM | OXYGEN SATURATION: 95 % | DIASTOLIC BLOOD PRESSURE: 79 MMHG | TEMPERATURE: 97.3 F

## 2024-05-08 DIAGNOSIS — R74.8 ABNORMAL LEVELS OF OTHER SERUM ENZYMES: ICD-10-CM

## 2024-05-08 DIAGNOSIS — M32.9 SYSTEMIC LUPUS ERYTHEMATOSUS, UNSPECIFIED: ICD-10-CM

## 2024-05-08 PROCEDURE — 99214 OFFICE O/P EST MOD 30 MIN: CPT

## 2024-05-08 PROCEDURE — G2211 COMPLEX E/M VISIT ADD ON: CPT | Mod: NC,1L

## 2024-05-08 RX ORDER — HYDROXYCHLOROQUINE SULFATE 200 MG/1
200 TABLET, FILM COATED ORAL
Qty: 60 | Refills: 5 | Status: ACTIVE | COMMUNITY
Start: 2021-09-15 | End: 1900-01-01

## 2024-05-08 NOTE — PHYSICAL EXAM
[General Appearance - Well Nourished] : well nourished [General Appearance - Well Developed] : well developed [Sclera] : the sclera and conjunctiva were normal [Hearing Threshold Finger Rub Not [place]] : hearing was normal [Auscultation Breath Sounds / Voice Sounds] : lungs were clear to auscultation bilaterally [Heart Rate And Rhythm] : heart rate was normal and rhythm regular [Heart Sounds] : normal S1 and S2 [] : no rash [Skin Lesions] : no skin lesions [Impaired Insight] : insight and judgment were intact [Affect] : the affect was normal [Mood] : the mood was normal [Nail Clubbing] : no clubbing  or cyanosis of the fingernails [Musculoskeletal - Swelling] : no joint swelling seen [Motor Tone] : muscle strength and tone were normal

## 2024-05-08 NOTE — ASSESSMENT
[FreeTextEntry1] : 42 yo man with APLS ( triple antibody positive, multiple DVTs), SLE ( positive CARMINE /DNA/arthritis and oral lesion) and sjogrens ( dry eye and positive RO) .  doing better on HCQ.  However patient does not follow closely.   patient also woth positive pANCA with negative MPO/PR3   --cont HCQ, eye exam every 6 months.  he had eye exam2/28/2024 --cont to follow up with hematology: cont warfarin  --lupus labs prior next appointment  --patient is to avoid sunbathing as this is a well know lupus trigger.  we have discussed ways to protect himself.  we have discussed symptoms that suggest a lupus flare  --abnormal LFts: check US and AIH panel.  repeat Lfts

## 2024-05-08 NOTE — HISTORY OF PRESENT ILLNESS
[FreeTextEntry1] : 40 yo man with history of basal cancer of the sole of the left foot, fatty liver, APLS (with triple positive and multiple DVTS) , SLE ( +DEBORAH/+dsDNA/low complements, arthritis and possible oral ulcers) , Sjogren ( dry eye/mouth and positive Ro) .  patient also has positive pANCA with negative MPO/PR3 and no evidence of vasculitis.  he has abnormal LFts   he has been out of Plaquenil 400mg daily since feb 2024.  He had eye exam 2/2024.  patient denies any more joint pain or joint swelling. denies any morning stiffness.    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   he is on warfarin.  labs with low C4 and borderline positive dsDNA, urine normal.  he has chronic leukopenia and mildly low plts however his last cbc was normal.   Patient has dry eye and uses artificial tears 3 times a day.    No dry mouth.  he denies any new rashes or oral lesions. No SOB/Cough, diarrhea, blood in stool.  he denies any parotid enlargement/c/night sweats, wt loss.  No changes in urination.     In the past he complaint of foot burning and hip pain.  hip xray normal  labs:  dsDNA 91 c4 5  c3 80 normal UA and PCR Ro>8 deborah 1:320 speckled RNA polymerase 21( borderline)  panca positive but negative MPO and PR3 NEGATIVE: RF/CCP/HLA b27/sm/rnp/la/IVA/centromere normal G6PD normal TSH/CPK ++TG antibodies 138 negative GC/LYme homocytein normal antithrombin normal Protein s AG normal factor V Leiden normal PTT 75 DRVVT>180 ( on xarelto)  ACL  ( >150)  ACL iGM >150 ( >150)  B2GP IgG 109 ( 104) B2GP igM 150 ( >150)  vit d 21

## 2024-08-14 ENCOUNTER — APPOINTMENT (OUTPATIENT)
Dept: RHEUMATOLOGY | Facility: CLINIC | Age: 44
End: 2024-08-14

## 2025-03-06 ENCOUNTER — APPOINTMENT (OUTPATIENT)
Dept: RHEUMATOLOGY | Facility: CLINIC | Age: 45
End: 2025-03-06

## 2025-06-09 ENCOUNTER — APPOINTMENT (OUTPATIENT)
Dept: RHEUMATOLOGY | Facility: CLINIC | Age: 45
End: 2025-06-09